# Patient Record
Sex: MALE | Race: WHITE
[De-identification: names, ages, dates, MRNs, and addresses within clinical notes are randomized per-mention and may not be internally consistent; named-entity substitution may affect disease eponyms.]

---

## 2017-06-17 ENCOUNTER — HEALTH MAINTENANCE LETTER (OUTPATIENT)
Age: 22
End: 2017-06-17

## 2017-10-05 ENCOUNTER — OFFICE VISIT (OUTPATIENT)
Dept: FAMILY MEDICINE | Facility: CLINIC | Age: 22
End: 2017-10-05
Payer: COMMERCIAL

## 2017-10-05 VITALS
SYSTOLIC BLOOD PRESSURE: 124 MMHG | HEART RATE: 101 BPM | DIASTOLIC BLOOD PRESSURE: 60 MMHG | WEIGHT: 160 LBS | OXYGEN SATURATION: 97 % | HEIGHT: 70 IN | TEMPERATURE: 98.2 F | BODY MASS INDEX: 22.9 KG/M2

## 2017-10-05 DIAGNOSIS — F41.1 GAD (GENERALIZED ANXIETY DISORDER): Primary | ICD-10-CM

## 2017-10-05 DIAGNOSIS — Z23 NEED FOR PROPHYLACTIC VACCINATION WITH TETANUS-DIPHTHERIA (TD): ICD-10-CM

## 2017-10-05 DIAGNOSIS — F11.21 HEROIN USE DISORDER, MODERATE, IN SUSTAINED REMISSION (H): ICD-10-CM

## 2017-10-05 DIAGNOSIS — Z23 NEED FOR PROPHYLACTIC VACCINATION AND INOCULATION AGAINST INFLUENZA: ICD-10-CM

## 2017-10-05 DIAGNOSIS — F32.A DEPRESSION, UNSPECIFIED DEPRESSION TYPE: ICD-10-CM

## 2017-10-05 DIAGNOSIS — F41.0 ANXIETY ATTACK: ICD-10-CM

## 2017-10-05 PROCEDURE — 99214 OFFICE O/P EST MOD 30 MIN: CPT | Performed by: FAMILY MEDICINE

## 2017-10-05 RX ORDER — HYDROXYZINE HYDROCHLORIDE 25 MG/1
25-50 TABLET, FILM COATED ORAL EVERY 6 HOURS PRN
Qty: 40 TABLET | Refills: 1 | Status: SHIPPED | OUTPATIENT
Start: 2017-10-05 | End: 2017-11-01

## 2017-10-05 RX ORDER — VENLAFAXINE HYDROCHLORIDE 37.5 MG/1
CAPSULE, EXTENDED RELEASE ORAL
Qty: 46 CAPSULE | Refills: 0 | Status: SHIPPED | OUTPATIENT
Start: 2017-10-05 | End: 2017-10-12

## 2017-10-05 ASSESSMENT — ANXIETY QUESTIONNAIRES
3. WORRYING TOO MUCH ABOUT DIFFERENT THINGS: NEARLY EVERY DAY
6. BECOMING EASILY ANNOYED OR IRRITABLE: NEARLY EVERY DAY
5. BEING SO RESTLESS THAT IT IS HARD TO SIT STILL: MORE THAN HALF THE DAYS
1. FEELING NERVOUS, ANXIOUS, OR ON EDGE: NEARLY EVERY DAY
7. FEELING AFRAID AS IF SOMETHING AWFUL MIGHT HAPPEN: NEARLY EVERY DAY
GAD7 TOTAL SCORE: 20
IF YOU CHECKED OFF ANY PROBLEMS ON THIS QUESTIONNAIRE, HOW DIFFICULT HAVE THESE PROBLEMS MADE IT FOR YOU TO DO YOUR WORK, TAKE CARE OF THINGS AT HOME, OR GET ALONG WITH OTHER PEOPLE: EXTREMELY DIFFICULT
2. NOT BEING ABLE TO STOP OR CONTROL WORRYING: NEARLY EVERY DAY

## 2017-10-05 ASSESSMENT — PATIENT HEALTH QUESTIONNAIRE - PHQ9
SUM OF ALL RESPONSES TO PHQ QUESTIONS 1-9: 13
5. POOR APPETITE OR OVEREATING: NEARLY EVERY DAY

## 2017-10-05 NOTE — NURSING NOTE
"Chief Complaint   Patient presents with     Anxiety       Initial /60 (BP Location: Right arm, Patient Position: Sitting, Cuff Size: Adult Large)  Pulse 101  Temp 98.2  F (36.8  C) (Oral)  Ht 5' 10\" (1.778 m)  Wt 160 lb (72.6 kg)  SpO2 97%  BMI 22.96 kg/m2 Estimated body mass index is 22.96 kg/(m^2) as calculated from the following:    Height as of this encounter: 5' 10\" (1.778 m).    Weight as of this encounter: 160 lb (72.6 kg).  Medication Reconciliation: complete  "

## 2017-10-05 NOTE — PATIENT INSTRUCTIONS
"               Panic Disorder  What is panic disorder?   Panic disorder is an anxiety disorder. When panic attacks occur repeatedly, without warning, it is called panic disorder. These attacks can happen many times every day or every week. People with this disorder might worry about having these attacks throughout the day. It can interfere with work and personal life.  How does it occur?   Panic is a \"fight or flight\" reaction. It is an adrenaline surge that goes wrong. How it happens is not known. Scientists know that certain parts of the brain and nervous system cause the emotional and physical surge of fear. A panic attack is very scary, but having one attack doesn't usually mean that you are developing panic disorder.  Panic disorder usually begins when you are a teenager or a young adult. Sometimes it begins after age 30, but almost never in middle age or later. It tends to run in families. Studies of identical twins suggest a genetic link to the disorder. However, one half or more of people with panic disorder do not have a close relative with the same problem.  Many people with panic disorder also have agoraphobia, which means you avoid going places or doing things because you are afraid you will panic and have no help. It is common to have depression along with panic disorder.  What are the symptoms?   Having at least 4 of the following symptoms means a person is having a panic attack:  feeling intense fear and being afraid that something terrible is about to happen   worrying about losing control   worrying about dying, going crazy, or having a heart attack   having many body symptoms such as a pounding heart, upset stomach, diarrhea, shaking, sweating, or being hot or cold   feeling like they are choking or can't breathe   being dizzy, faint, or lightheaded   feeling numb or tingling sensations in the arms, legs, or other parts of the body   feeling detached or as if they are watching themselves from " outside the body  These feelings start suddenly and become very strong, usually within 10 minutes. The attacks are often unpredictable.  The symptoms of panic disorder are the same as a panic attack except that the attacks come repeatedly, the person is quite fearful between attacks that another attack will happen, and changes behavior to avoid another panic attack. Panic attacks usually last from 20 to 30 minutes.  Because common symptoms of a panic attack include chest pain and shortness of breath, you may mistake a panic attack for a heart attack. If you have severe chest pain or trouble breathing, get medical treatment right away to find out the cause.  How is it diagnosed?   Your healthcare provider or a mental health professional can tell you if your symptoms are caused by panic disorder.  Your healthcare provider will ask about your symptoms and any drug or alcohol use. You may have lab tests to rule out medical problems such as hormone imbalances. It is important to make sure that medical problems are not causing the panic attacks. Some medicines may cause or increase panic attacks. You may need to change your medicines to make sure they are not part of the problem. No lab tests can diagnose panic disorder.  If you worry about having another panic attack, or have the symptoms of panic attacks for more than 30 days, it usually means that you have panic disorder.  How is it treated?   Do not try to overcome panic disorder all by yourself. Panic disorder can be successfully treated with psychotherapy and medicine. Discuss these treatment choices with your healthcare provider or a mental health professional.  Medicine  Several medicines can help treat panic disorder. Your healthcare provider will work with you to carefully select the best one for you.  Psychotherapy  Seeing a psychiatrist or psychotherapist is helpful. There are several kinds of therapy that can help a person with anxiety. Support groups are  also very helpful.  Cognitive behavioral therapy (CBT) is a form of psychotherapy that is especially effective with panic disorder. CBT is a way to help you identify and change thoughts that lead to panic attacks. Replacing negative thoughts with more positive ones can help you to control panic attacks and the fear that a panic attack will happen.  Claims have been made that certain herbal and dietary products help control panic disorder symptoms. No herb or dietary supplement has been proven to consistently or completely relieve symptoms of panic disorder. Supplements are not tested or standardized and may vary in strength and effects. They may have side effects and are not always safe.   Learning ways to relax may help. Yoga and meditation may also be helpful. You may want to talk with your healthcare provider about using these methods along with medicines and psychotherapy.   How long will the effects last?  Panic disorder may affect you for a short time or may continue for many years. With treatment, most people improve in less than a year.  How can I take care of myself?   Maintaining a healthy lifestyle is important. To help control panic disorder:  Get support. Talk with family and friends. Consider joining a support group in your area.   Learn to manage stress. Ask for help at home and work when the load is too great to handle. Find ways to relax, for example take up a hobby, listen to music, watch movies, take walks. Try deep breathing exercises when you feel stressed.   Take care of your physical health. Try to get at least 7 to 9 hours of sleep each night. Eat a healthy diet. Limit caffeine. If you smoke, quit. Avoid alcohol and drugs, because they can make your symptoms worse. Exercise according to your healthcare provider's instructions.   Check your medicines. To help prevent problems, tell your healthcare provider and pharmacist about all the medicines, natural remedies, vitamins, and other  supplements that you take.   Contact your healthcare provider or therapist if you have any questions or your symptoms seem to be getting worse.  When should I seek help?  Do not try to overcome panic disorder all by yourself. Seek help from your healthcare provider or a mental health professional.  Get emergency care if you or a loved one has serious thoughts of suicide or self harm. Also seek immediate help if you have severe chest pain or trouble breathing.    Published by [employer].  This content is reviewed periodically and is subject to change as new health information becomes available. The information is intended to inform and educate and is not a replacement for medical evaluation, advice, diagnosis or treatment by a healthcare professional.  Written by Isrrael Seymour MD, and Radha Barker, PhD, for [employer].    2011 [employer] and/or its affiliates. All rights reserved.

## 2017-10-05 NOTE — MR AVS SNAPSHOT
"              After Visit Summary   10/5/2017    Rik Smith    MRN: [id number]           Patient Information     Date Of Birth          1995        Visit Information        Provider Department      10/5/2017 2:40 PM Aaron Christiansen MD [facility]        Today's Diagnoses     SONIDO (generalized anxiety disorder)    -  1    Need for prophylactic vaccination and inoculation against influenza        Need for prophylactic vaccination with tetanus-diphtheria (TD)        Anxiety attack        Heroin use disorder, moderate, in sustained remission (H)        Depression, unspecified depression type          Care Instructions                   Panic Disorder  What is panic disorder?   Panic disorder is an anxiety disorder. When panic attacks occur repeatedly, without warning, it is called panic disorder. These attacks can happen many times every day or every week. People with this disorder might worry about having these attacks throughout the day. It can interfere with work and personal life.  How does it occur?   Panic is a \"fight or flight\" reaction. It is an adrenaline surge that goes wrong. How it happens is not known. Scientists know that certain parts of the brain and nervous system cause the emotional and physical surge of fear. A panic attack is very scary, but having one attack doesn't usually mean that you are developing panic disorder.  Panic disorder usually begins when you are a teenager or a young adult. Sometimes it begins after age 30, but almost never in middle age or later. It tends to run in families. Studies of identical twins suggest a genetic link to the disorder. However, one half or more of people with panic disorder do not have a close relative with the same problem.  Many people with panic disorder also have agoraphobia, which means you avoid going places or doing things because you are afraid you will panic and have no help. It is common to have depression along with panic " disorder.  What are the symptoms?   Having at least 4 of the following symptoms means a person is having a panic attack:  feeling intense fear and being afraid that something terrible is about to happen   worrying about losing control   worrying about dying, going crazy, or having a heart attack   having many body symptoms such as a pounding heart, upset stomach, diarrhea, shaking, sweating, or being hot or cold   feeling like they are choking or can't breathe   being dizzy, faint, or lightheaded   feeling numb or tingling sensations in the arms, legs, or other parts of the body   feeling detached or as if they are watching themselves from outside the body  These feelings start suddenly and become very strong, usually within 10 minutes. The attacks are often unpredictable.  The symptoms of panic disorder are the same as a panic attack except that the attacks come repeatedly, the person is quite fearful between attacks that another attack will happen, and changes behavior to avoid another panic attack. Panic attacks usually last from 20 to 30 minutes.  Because common symptoms of a panic attack include chest pain and shortness of breath, you may mistake a panic attack for a heart attack. If you have severe chest pain or trouble breathing, get medical treatment right away to find out the cause.  How is it diagnosed?   Your healthcare provider or a mental health professional can tell you if your symptoms are caused by panic disorder.  Your healthcare provider will ask about your symptoms and any drug or alcohol use. You may have lab tests to rule out medical problems such as hormone imbalances. It is important to make sure that medical problems are not causing the panic attacks. Some medicines may cause or increase panic attacks. You may need to change your medicines to make sure they are not part of the problem. No lab tests can diagnose panic disorder.  If you worry about having another panic attack, or have the  symptoms of panic attacks for more than 30 days, it usually means that you have panic disorder.  How is it treated?   Do not try to overcome panic disorder all by yourself. Panic disorder can be successfully treated with psychotherapy and medicine. Discuss these treatment choices with your healthcare provider or a mental health professional.  Medicine  Several medicines can help treat panic disorder. Your healthcare provider will work with you to carefully select the best one for you.  Psychotherapy  Seeing a psychiatrist or psychotherapist is helpful. There are several kinds of therapy that can help a person with anxiety. Support groups are also very helpful.  Cognitive behavioral therapy (CBT) is a form of psychotherapy that is especially effective with panic disorder. CBT is a way to help you identify and change thoughts that lead to panic attacks. Replacing negative thoughts with more positive ones can help you to control panic attacks and the fear that a panic attack will happen.  Claims have been made that certain herbal and dietary products help control panic disorder symptoms. No herb or dietary supplement has been proven to consistently or completely relieve symptoms of panic disorder. Supplements are not tested or standardized and may vary in strength and effects. They may have side effects and are not always safe.   Learning ways to relax may help. Yoga and meditation may also be helpful. You may want to talk with your healthcare provider about using these methods along with medicines and psychotherapy.   How long will the effects last?  Panic disorder may affect you for a short time or may continue for many years. With treatment, most people improve in less than a year.  How can I take care of myself?   Maintaining a healthy lifestyle is important. To help control panic disorder:  Get support. Talk with family and friends. Consider joining a support group in your area.   Learn to manage stress. Ask for  help at home and work when the load is too great to handle. Find ways to relax, for example take up a hobby, listen to music, watch movies, take walks. Try deep breathing exercises when you feel stressed.   Take care of your physical health. Try to get at least 7 to 9 hours of sleep each night. Eat a healthy diet. Limit caffeine. If you smoke, quit. Avoid alcohol and drugs, because they can make your symptoms worse. Exercise according to your healthcare provider's instructions.   Check your medicines. To help prevent problems, tell your healthcare provider and pharmacist about all the medicines, natural remedies, vitamins, and other supplements that you take.   Contact your healthcare provider or therapist if you have any questions or your symptoms seem to be getting worse.  When should I seek help?  Do not try to overcome panic disorder all by yourself. Seek help from your healthcare provider or a mental health professional.  Get emergency care if you or a loved one has serious thoughts of suicide or self harm. Also seek immediate help if you have severe chest pain or trouble breathing.    Published by [email or web address].  This content is reviewed periodically and is subject to change as new health information becomes available. The information is intended to inform and educate and is not a replacement for medical evaluation, advice, diagnosis or treatment by a healthcare professional.  Written by Isrrael Seymour MD, and Rdaha Barker, PhD, for [email or web address].    2011 [facility] and/or its affiliates. All rights reserved.             Follow-ups after your visit        Who to contact     If you have questions or need follow up information about today's clinic visit or your schedule please contact [facility] directly at [phone].  Normal or non-critical lab and imaging results will be communicated to you by MyChart, letter or phone within 4 business days after the clinic has received the results. If you  "do not hear from us within 7 days, please contact the clinic through [employer] or phone. If you have a critical or abnormal lab result, we will notify you by phone as soon as possible.  Submit refill requests through [employer] or call your pharmacy and they will forward the refill request to us. Please allow 3 business days for your refill to be completed.          Additional Information About Your Visit        Business [employer] Information     [employer] gives you secure access to your electronic health record. If you see a primary care provider, you can also send messages to your care team and make appointments. If you have questions, please call your primary care clinic.  If you do not have a primary care provider, please call [phone] and they will assist you.        Care EveryWhere ID     This is your Care EveryWhere ID. This could be used by other organizations to access your [facility] medical records  [id number]        Your Vitals Were     Pulse Temperature Height Pulse Oximetry BMI (Body Mass Index)       101 98.2  F (36.8  C) (Oral) 5' 10\" (1.778 m) 97% 22.96 kg/m2        Blood Pressure from Last 3 Encounters:   10/05/17 124/60   02/12/15 100/58   02/24/14 92/60    Weight from Last 3 Encounters:   10/05/17 160 lb (72.6 kg)   02/12/15 144 lb (65.3 kg) (42 %)*   04/30/14 137 lb (62.1 kg) (36 %)*     * Growth percentiles are based on [facility] 2-20 Years data.              We Performed the Following          ADMIN VACCINE, ADDL [44571]          ADMIN VACCINE, FIRST [08840]     HC FLU VAC PRESRV FREE QUAD SPLIT VIR 3+YRS IM     TDAP VACCINE (ADACEL)          Today's Medication Changes          These changes are accurate as of: 10/5/17  3:36 PM.  If you have any questions, ask your nurse or doctor.               Start taking these medicines.        Dose/Directions    hydrOXYzine 25 MG tablet   Commonly known as:  ATARAX   Used for:  Anxiety attack   Started by:  Aaron Christiansen MD        Dose:  25-50 mg   Take 1-2 tablets " (25-50 mg) by mouth every 6 hours as needed for anxiety   Quantity:  40 tablet   Refills:  1       venlafaxine 37.5 MG 24 hr capsule   Commonly known as:  EFFEXOR-XR   Used for:  SONIDO (generalized anxiety disorder), Anxiety attack, Depression, unspecified depression type   Started by:  Aaron Christiansen MD        Take 1 capsule daily for 14 days, then take 2 capsules daily.   Quantity:  46 capsule   Refills:  0            Where to get your medicines      These medications were sent to [facility] [place] [address]     Phone:  [phone]     hydrOXYzine 25 MG tablet    venlafaxine 37.5 MG 24 hr capsule                Primary Care Provider Office Phone # Fax #    Aaron Christiansen [phone]       [address] [place] [address]        Equal Access to Services     [facility]: Hadii tad ku hadasho Soomaali, waaxda luqadaha, qaybta kaalmada adeegyada, waxay yfnin haynancyn lexi carranza . So [facility] [phone].    ATENCIÓN: Si habla español, tiene a fallon disposición servicios gratuitos de asistencia lingüística. Llame al [phone].    We comply with applicable federal civil rights laws and [place] laws. We do not discriminate on the basis of race, color, national origin, age, disability, sex, sexual orientation, or gender identity.            Thank you!     Thank you for choosing [facility]  for your care. Our goal is always to provide you with excellent care. Hearing back from our patients is one way we can continue to improve our services. Please take a few minutes to complete the written survey that you may receive in the mail after your visit with us. Thank you!             Your Updated Medication List - Protect others around you: Learn how to safely use, store and throw away your medicines at www.disposemymeds.org.          This list is accurate as of: 10/5/17  3:36 PM.   Always use your most recent med list.                   Brand Name Dispense Instructions for use Diagnosis    hydrOXYzine 25 MG tablet    ATARAX    40 tablet    Take 1-2 tablets (25-50 mg) by mouth every 6 hours as needed for anxiety    Anxiety attack       venlafaxine 37.5 MG 24 hr capsule    EFFEXOR-XR    46 capsule    Take 1 capsule daily for 14 days, then take 2 capsules daily.    SONIDO (generalized anxiety disorder), Anxiety attack, Depression, unspecified depression type

## 2017-10-05 NOTE — PROGRESS NOTES
"  SUBJECTIVE:                                                    Rik Smith is a 21 year old male who presents to clinic today for the following health issues:    Abnormal Mood Symptoms/ Anxiety  Onset: since childhood-    Description:   Depression: no  Anxiety: YES    Accompanying Signs & Symptoms:  Still participating in activities that you used to enjoy: YES    Fatigue: YES  Irritability: YES  Difficulty concentrating: YES  Changes in appetite: YES- a lot less recently  Problems with sleep: YES  Heart racing/beating fast : YES  Thoughts of hurting yourself or others: none    History:   Recent stress: no  Prior depression hospitalization: None  Family history of depression: no  Family history of anxiety: no    Precipitating factors:   Alcohol/drug use: no    Alleviating factors:  nothing    Therapies Tried and outcome: None    Pt reports being anxious, \"on edge\" for some time now. He denies any stressful situations, he states he just can't relax. He reports self-medication, including marijuana, heroine and alcohol, in the past but has not done so for some time now. He went to treatment for substance abuse and has experienced success, though after a few trials. He has noticed a quick jump to anger when \"badgered\", something he is not comfortable with or used to. Raheel also experiences panic attack type episodes, that he refers to as \"high anxiety\".    Raheel experienced anxiety as a child as well. He reports his parents were alcoholics, and his father was relatively absent. Talking about this subject causes the pt difficulty and can induce anxiety.     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 He also reports regaining friendships he once lost to drug abuse.     Problem list and histories reviewed & adjusted, as indicated.  Additional history: as documented    ROS:  Constitutional, HEENT, cardiovascular, pulmonary, GI, , musculoskeletal, neuro, skin, endocrine and psych systems are negative, except as " "otherwise noted.    This document serves as a record of the services and decisions personally performed and made by Aaron Christiansen MD. It was created on her behalf by Rachel Engel, a trained medical scribe. The creation of this document is based the provider's statements to the medical scribe.  Scribe Rachel Engel 3:15 PM, October 5, 2017    OBJECTIVE:                                                    /60 (BP Location: Right arm, Patient Position: Sitting, Cuff Size: Adult Large)  Pulse 101  Temp 98.2  F (36.8  C) (Oral)  Ht 1.778 m (5' 10\")  Wt 72.6 kg (160 lb)  SpO2 97%  BMI 22.96 kg/m2 Body mass index is 22.96 kg/(m^2).   GENERAL: healthy, alert, well nourished, well hydrated, no distress  RESP: lungs clear to auscultation - no rales, no rhonchi, no wheezes  CV: regular rates and rhythm, normal S1 S2, no S3 or S4 and no murmur, no click or rub -  ABDOMEN: soft, no tenderness, no  hepatosplenomegaly, no masses, normal bowel sounds  MS: extremities- no gross deformities noted, no edema  SKIN: no suspicious lesions, no rashes  PSYCH: Anxiety; Alert and oriented times 3; speech- coherent , normal rate and volume; able to articulate logical thoughts, able to abstract reason, no tangential thoughts, no hallucinations or delusions, affect- normal   Diagnostic test results:  none      ASSESSMENT/PLAN:         Rik was seen today for anxiety.    Diagnoses and all orders for this visit:    SONIDO (generalized anxiety disorder) - Pt prescribed medications to help alleviate symptoms; encouraged to continue seeing psychologist  -     venlafaxine (EFFEXOR-XR) 37.5 MG 24 hr capsule; Take 1 capsule daily for 14 days, then take 2 capsules daily.    Anxiety attack - see above  -     venlafaxine (EFFEXOR-XR) 37.5 MG 24 hr capsule; Take 1 capsule daily for 14 days, then take 2 capsules daily.  -     hydrOXYzine (ATARAX) 25 MG tablet; Take 1-2 tablets (25-50 mg) by mouth every 6 hours as needed for anxiety    Heroin use " "disorder, moderate, in sustained remission (H) - Pt is in remission, doing very well    Depression, unspecified depression type - see above  -     venlafaxine (EFFEXOR-XR) 37.5 MG 24 hr capsule; Take 1 capsule daily for 14 days, then take 2 capsules daily.    Pt denied influenza vaccination today.    Risks, benefits and alternatives of treatments discussed. Plan agreed on.      Followup: Return in 3-4 weeks for med check    Will call, return to clinic, or go to ED if worsening or symptoms not improving as discussed.    See patient instructions.     BP Screening:   Last 3 BP Readings:    BP Readings from Last 3 Encounters:   10/05/17 124/60   02/12/15 100/58   02/24/14 92/60       The following was recommended to the patient:  Re-screen BP within a year and recommended lifestyle modifications  BMI:   Estimated body mass index is 22.96 kg/(m^2) as calculated from the following:    Height as of this encounter: 1.778 m (5' 10\").    Weight as of this encounter: 72.6 kg (160 lb).     Health Maintenance Topics with due status: Overdue       Topic Date Due    PEDS DTAP/TDAP 12/15/2007    HPV IMMUNIZATION 12/15/2007    TETANUS IMMUNIZATION (SYSTEM ASSIGNED) 12/15/2014    INFLUENZA VACCINE (SYSTEM ASSIGNED) 09/01/2017       Health maintenance reviewed/updated? Yes  The information in this document, created by the medical scribe for me, accurately reflects the services I personally performed and the decisions made by me. I have reviewed and approved this document for accuracy prior to leaving the patient care area.  3:14 PM, 10/05/17        Slim Christiansen MD   "

## 2017-10-06 ASSESSMENT — ANXIETY QUESTIONNAIRES: GAD7 TOTAL SCORE: 20

## 2017-10-31 ENCOUNTER — TELEPHONE (OUTPATIENT)
Dept: FAMILY MEDICINE | Facility: CLINIC | Age: 22
End: 2017-10-31

## 2017-10-31 ASSESSMENT — ANXIETY QUESTIONNAIRES
6. BECOMING EASILY ANNOYED OR IRRITABLE: MORE THAN HALF THE DAYS
IF YOU CHECKED OFF ANY PROBLEMS ON THIS QUESTIONNAIRE, HOW DIFFICULT HAVE THESE PROBLEMS MADE IT FOR YOU TO DO YOUR WORK, TAKE CARE OF THINGS AT HOME, OR GET ALONG WITH OTHER PEOPLE: EXTREMELY DIFFICULT
GAD7 TOTAL SCORE: 15
5. BEING SO RESTLESS THAT IT IS HARD TO SIT STILL: SEVERAL DAYS
3. WORRYING TOO MUCH ABOUT DIFFERENT THINGS: NEARLY EVERY DAY
2. NOT BEING ABLE TO STOP OR CONTROL WORRYING: NEARLY EVERY DAY
7. FEELING AFRAID AS IF SOMETHING AWFUL MIGHT HAPPEN: NOT AT ALL
1. FEELING NERVOUS, ANXIOUS, OR ON EDGE: NEARLY EVERY DAY

## 2017-10-31 ASSESSMENT — PATIENT HEALTH QUESTIONNAIRE - PHQ9
SUM OF ALL RESPONSES TO PHQ QUESTIONS 1-9: 3
5. POOR APPETITE OR OVEREATING: NEARLY EVERY DAY

## 2017-10-31 NOTE — TELEPHONE ENCOUNTER
Reason for Call:  Same Day Appointment, Requested Provider:  Aaron Christiansen MD    PCP: Aaron Christiansen    Reason for visit: The patient wants to be seen today by Dr. Christiansen for his anxiety. He says the SSRIs are not helping him at all.     Duration of symptoms: Years    Have you been treated for this in the past? Yes    Can we leave a detailed message on this number? YES    Phone number patient can be reached at: Cell number on file:    Telephone Information:   Mobile [phone]     Best Time: Anytime    Call taken on 10/31/2017 at 10:13 AM by Juju Nieves

## 2017-10-31 NOTE — PROGRESS NOTES
"  SUBJECTIVE:                                                    Rik Smith is a 21 year old male who presents to clinic today for the following health issues:    Concern - Anxiety  Onset: very long time, years, ever since patient can remember. Has gotten much worse in the past 2 weeks    Description:   States his anxiety has been pretty bad lately, especially in the morning.    Intensity: moderate, severe    Progression of Symptoms:  worsening and constant    Accompanying Signs & Symptoms:  In the morning, patient states that he wakes up with stomach pain and chest pressure R/T anxiety. Cannot stop thinking about everything.   Has been having panic attacks when he wakes up every morning  Vomiting    Previous history of similar problem:   Yes    Precipitating factors:   Worsened by: None    Alleviating factors:  Improved by: None    Problem list and histories reviewed & adjusted, as indicated.  Additional history: as documented    ROS:  Constitutional, HEENT, cardiovascular, pulmonary, GI, , musculoskeletal, neuro, skin, endocrine and psych systems are negative, except as otherwise noted.    OBJECTIVE:                                                    There were no vitals taken for this visit. There is no height or weight on file to calculate BMI.   {.:515216::\"GENERAL: healthy, alert, well nourished, well hydrated, no distress\",\"HENT: ear canals- normal; TMs- normal; Nose- normal; Mouth- no ulcers, no lesions\",\"NECK: no tenderness, no adenopathy, no asymmetry, no masses, no stiffness; thyroid- normal to palpation\",\"RESP: lungs clear to auscultation - no rales, no rhonchi, no wheezes\",\"CV: regular rates and rhythm, normal S1 S2, no S3 or S4 and no murmur, no click or rub -\",\"ABDOMEN: soft, no tenderness, no  hepatosplenomegaly, no masses, normal bowel sounds\"}  Diagnostic test results:  {DIAGNOSTIC TEST RESULTS:335952::\"none \"}     ASSESSMENT/PLAN:         There are no diagnoses linked to this " encounter.    Risks, benefits and alternatives of treatments discussed. Plan agreed on.      Followup:***    Will call, return to clinic, or go to ED if worsening or symptoms not improving as discussed.    See patient instructions.     {Quality Requirements:550091}        Slim Christiansen MD   Pager: [phone]

## 2017-10-31 NOTE — TELEPHONE ENCOUNTER
Called # below    Concern - Anxiety  Onset: very long time, years, ever since patient can remember. Has gotten much worse in the past 2 weeks    Description:   States his anxiety has been pretty bad lately, especially in the morning.    Intensity: moderate, severe    Progression of Symptoms:  worsening and constant    Accompanying Signs & Symptoms:  In the morning, patient states that he wakes up with stomach pain and chest pressure R/T anxiety. Cannot stop thinking about everything.   Has been having panic attacks when he wakes up every morning  Vomiting    Previous history of similar problem:   Yes    Precipitating factors:   Worsened by: None    Alleviating factors:  Improved by: None    Therapies Tried and outcome:        Disp Refills Start End LOBITO      venlafaxine (EFFEXOR-XR) 37.5 MG 24 hr capsule 46 capsule 0 10/5/2017  --     Sig: Take 1 capsule daily for 14 days, then take 2 capsules daily.        Disp Refills Start End LOBITO      hydrOXYzine (ATARAX) 25 MG tablet 40 tablet 1 10/5/2017  --     Sig: Take 1-2 tablets (25-50 mg) by mouth every 6 hours as needed for anxiety     Patient stated that he stopped taking the medication after 1 week because he did not notice any difference.     PHQ9/GAD7 done via phone:  PHQ-9 SCORE 2/3/2011 10/5/2017 10/31/2017   Total Score 17 - -   Total Score - 13 3     SONIDO-7 SCORE 10/5/2017 10/31/2017   Total Score 20 15     DENIES: suicidal thoughts/ideations, thoughts of hurting himself or others.     OV scheduled for 4pm with MD RAI (ok'd per agapito).     Jenelle Larson RN  [place] [facility]

## 2017-11-01 ENCOUNTER — TRANSFERRED RECORDS (OUTPATIENT)
Dept: HEALTH INFORMATION MANAGEMENT | Facility: CLINIC | Age: 22
End: 2017-11-01

## 2017-11-01 ENCOUNTER — OFFICE VISIT (OUTPATIENT)
Dept: FAMILY MEDICINE | Facility: CLINIC | Age: 22
End: 2017-11-01
Payer: COMMERCIAL

## 2017-11-01 VITALS
BODY MASS INDEX: 22.12 KG/M2 | TEMPERATURE: 97.5 F | DIASTOLIC BLOOD PRESSURE: 82 MMHG | HEART RATE: 101 BPM | WEIGHT: 154.5 LBS | OXYGEN SATURATION: 97 % | SYSTOLIC BLOOD PRESSURE: 128 MMHG | HEIGHT: 70 IN

## 2017-11-01 DIAGNOSIS — F41.0 ANXIETY ATTACK: ICD-10-CM

## 2017-11-01 DIAGNOSIS — F11.20 METHADONE MAINTENANCE THERAPY PATIENT (H): Primary | ICD-10-CM

## 2017-11-01 DIAGNOSIS — F41.1 GAD (GENERALIZED ANXIETY DISORDER): ICD-10-CM

## 2017-11-01 PROCEDURE — 99214 OFFICE O/P EST MOD 30 MIN: CPT | Performed by: PHYSICIAN ASSISTANT

## 2017-11-01 RX ORDER — BUPROPION HYDROCHLORIDE 150 MG/1
150 TABLET ORAL EVERY MORNING
Qty: 90 TABLET | Refills: 0 | Status: SHIPPED | OUTPATIENT
Start: 2017-11-01 | End: 2017-11-14

## 2017-11-01 RX ORDER — METHADONE HYDROCHLORIDE 10 MG/1
40 TABLET ORAL EVERY OTHER DAY
Refills: 0 | COMMUNITY
Start: 2017-11-01 | End: 2019-07-30

## 2017-11-01 RX ORDER — PROPRANOLOL HYDROCHLORIDE 60 MG/1
60 TABLET ORAL 2 TIMES DAILY PRN
Qty: 60 TABLET | Refills: 1 | Status: SHIPPED | OUTPATIENT
Start: 2017-11-01 | End: 2017-11-14

## 2017-11-01 ASSESSMENT — ANXIETY QUESTIONNAIRES: GAD7 TOTAL SCORE: 15

## 2017-11-01 NOTE — PROGRESS NOTES
"  SUBJECTIVE:   Rik Smith is a 21 year old male who presents to clinic today for the following health issues:    Anxiety  Rik presents to clinic for increased anxiety within the last month. He reports that the anxiety is severe in the morning right when he wakes up. When the anxiety increases her gets a \"dropping\" feeling in his stomach and chest with associated nausea. He reports there has not been any significant changes in his home life, family, or work. He reports that he started a new job in the last 2 weeks but reports that it is going very well. He was recently prescribed venlafaxine and hydroxyzine by Dr. Christiansen ([facility]) but discontinued the medications within a week due to him not feeling a difference. He reports he has been on SSRI's (zoloft in 2012) in the past for extended periods and felt no difference so he feels sceptical towards that class of drug. He has set up appointments for anxiety therapy but has had to reschedule due to work. Of note Rik has a history of heroin abuse for which he seen at [facility] in [place] in 2015. He reports that he has been sober since completing the 6 week program. He is currently seen at the Methadone clinic in [place] at takes 40mg approximately every other day. He voiced intents to stopping the methadone treatment as he feels it is similar to being addicted to a substance. He is also seeing a personal therapist weekly through the clinic and is administered random drug screens. Rik has been seen at the methadone clinic for a little more than one year. He does not attribute his increased anxiety to a methadone taper or decrease. He reports that he has a good support system and home and denies thoughts of self harm or harming others.    PHQ-9 SCORE 2/3/2011 10/5/2017 10/31/2017   Total Score 17 - -   Total Score - 13 3     SONIDO-7 SCORE 10/5/2017 10/31/2017   Total Score 20 15     Problem list and histories reviewed & " adjusted, as indicated.  Additional history: as documented    Patient Active Problem List   Diagnosis     Attention deficit hyperactivity disorder (ADHD)     Anxiety attack     SONIDO (generalized anxiety disorder)     Heroin use disorder, moderate, in sustained remission (H)     Past Surgical History:   Procedure Laterality Date     none         Social History   Substance Use Topics     Smoking status: Current Every Day Smoker     Smokeless tobacco: Never Used     Alcohol use No     Family History   Problem Relation Age of Onset     Asthma Father      Alcohol/Drug Father      Thyroid Disease Maternal Grandmother      HEART DISEASE Maternal Grandfather      DIABETES Paternal Grandmother      type 1     Lipids Paternal Grandfather      CEREBROVASCULAR DISEASE Paternal Grandfather 40     Hypertension Paternal Grandfather      Alcohol/Drug Paternal Grandfather      Substance Abuse Brother      Heroin addiction         Current Outpatient Prescriptions   Medication Sig Dispense Refill     methadone (DOLOPHINE) 10 MG tablet Take 4 tablets (40 mg) by mouth every other day  0     propranolol HCl 60 MG TABS Take 1 tablet (60 mg) by mouth 2 times daily as needed For anxiety 60 tablet 1     buPROPion (WELLBUTRIN XL) 150 MG 24 hr tablet Take 1 tablet (150 mg) by mouth every morning 90 tablet 0     Allergies   Allergen Reactions     Amoxicillin-Pot Clavulanate Hives         Reviewed and updated as needed this visit by clinical staff  Tobacco  Allergies  Meds  Med Hx  Surg Hx  Fam Hx  Soc Hx      Reviewed and updated as needed this visit by Provider  Tobacco  Allergies  Med Hx  Surg Hx  Fam Hx  Soc Hx        ROS:  Constitutional, HEENT, cardiovascular, pulmonary, GI, , musculoskeletal, neuro, skin, endocrine and psych systems are negative, except as otherwise noted.    This document serves as a record of the services and decisions personally performed and made by Dee Rubio PA-C. It was created on her behalf by  "Vimal Campo, a trained medical scribe. The creation of this document is based on the provider's statements to the medical scribe.  Vimal Alber 11:36 AM November 1, 2017    OBJECTIVE:   /82  Pulse 101  Temp 97.5  F (36.4  C) (Tympanic)  Ht 5' 10\" (1.778 m)  Wt 154 lb 8 oz (70.1 kg)  SpO2 97%  BMI 22.17 kg/m2  Body mass index is 22.17 kg/(m^2).  GENERAL: healthy, alert and no distress  RESP: lungs clear to auscultation - no rales, rhonchi or wheezes  CV: regular rate and rhythm, normal S1 S2, no S3 or S4, no murmur, click or rub, no peripheral edema and peripheral pulses strong  PSYCH: mentation appears normal, affect normal/bright    Diagnostic Test Results:  none     ASSESSMENT/PLAN:   Rik was seen today for anxiety.    Diagnoses and all orders for this visit:    Methadone maintenance therapy patient (H)  Patient is being seen at methadone clinic in [place]. He is taking 40mg of methadone approximately every other day or less frequently than that. Patient voiced intent to cease methadone treatment. He is also seeing a personal therapist weekly at the methadone clinic.    Anxiety attack, SONIDO (generalized anxiety disorder)  Patient reports increased anxiety in the last month and does not report significant life events that could be contributing to the anxiety. Patient is very hesitant to start SSRI medications as he has tried Zoloft in the past and it was unsuccessful in treating is anxiety. He is also a former heroin abuser and would not like to start on any potentially addictive medication. Discussed with patient to contact his methadone clinic to see if they would approve his use of gabapentin. Also discussed with patient the importance of attending anxiety therapy to alleviate some symptoms. Genesight testing administered upon visit today and will contact patient when results are received. Start bupropion and propanolol to treat current anxiety. Follow up in 6 weeks for a med " check or sooner if medication is not effective.  -     buPROPion (WELLBUTRIN XL) 150 MG 24 hr tablet; Take 1 tablet (150 mg) by mouth every morning  -     propranolol HCl 60 MG TABS; Take 1 tablet (60 mg) by mouth 2 times daily as needed For anxiety    Greater than 25 minutes were spent with the patient. The majority of this time was coordinating care and counseling regarding the above diagnosis .    The information in this document, created by the medical scribe for me, accurately reflects the services I personally performed and the decisions made by me. I have reviewed and approved this document for accuracy prior to leaving the patient care area.  November 1, 2017 11:41 AM    Dee Rubio PA-C  [facility]

## 2017-11-01 NOTE — MR AVS SNAPSHOT
"              After Visit Summary   11/1/2017    Rik Smith    MRN: [id number]           Patient Information     Date Of Birth          1995        Visit Information        Provider Department      11/1/2017 11:20 AM Dee Rubio PA-C [facility]        Today's Diagnoses     Methadone maintenance therapy patient (H)    -  1    Anxiety attack        SONIDO (generalized anxiety disorder)           Follow-ups after your visit        Who to contact     If you have questions or need follow up information about today's clinic visit or your schedule please contact [facility] directly at [phone].  Normal or non-critical lab and imaging results will be communicated to you by [employer], letter or phone within 4 business days after the clinic has received the results. If you do not hear from us within 7 days, please contact the clinic through [employer] or phone. If you have a critical or abnormal lab result, we will notify you by phone as soon as possible.  Submit refill requests through [employer] or call your pharmacy and they will forward the refill request to us. Please allow 3 business days for your refill to be completed.          Additional Information About Your Visit        MyChart Information     [employer] gives you secure access to your electronic health record. If you see a primary care provider, you can also send messages to your care team and make appointments. If you have questions, please call your primary care clinic.  If you do not have a primary care provider, please call [phone] and they will assist you.        Care EveryWhere ID     This is your Care EveryWhere ID. This could be used by other organizations to access your [facility] medical records  [id number]        Your Vitals Were     Pulse Temperature Height Pulse Oximetry BMI (Body Mass Index)       101 97.5  F (36.4  C) (Tympanic) 5' 10\" (1.778 m) 97% 22.17 kg/m2        Blood Pressure from Last 3 " Encounters:   11/01/17 128/82   10/05/17 124/60   02/12/15 100/58    Weight from Last 3 Encounters:   11/01/17 154 lb 8 oz (70.1 kg)   10/05/17 160 lb (72.6 kg)   02/12/15 144 lb (65.3 kg) (42 %)*     * Growth percentiles are based on [facility] 2-20 Years data.              Today, you had the following     No orders found for display         Today's Medication Changes          These changes are accurate as of: 11/1/17 11:55 AM.  If you have any questions, ask your nurse or doctor.               Start taking these medicines.        Dose/Directions    buPROPion 150 MG 24 hr tablet   Commonly known as:  WELLBUTRIN XL   Used for:  SONIDO (generalized anxiety disorder), Anxiety attack   Started by:  Dee Rubio PA-C        Dose:  150 mg   Take 1 tablet (150 mg) by mouth every morning   Quantity:  90 tablet   Refills:  0       propranolol HCl 60 MG Tabs   Used for:  SONIDO (generalized anxiety disorder)   Started by:  Dee Rubio PA-C        Dose:  60 mg   Take 1 tablet (60 mg) by mouth 2 times daily as needed For anxiety   Quantity:  60 tablet   Refills:  1         Stop taking these medicines if you haven't already. Please contact your care team if you have questions.     hydrOXYzine 25 MG tablet   Commonly known as:  ATARAX   Stopped by:  Dee Rubio PA-C                Where to get your medicines      These medications were sent to [facility] [address] [address]     Phone:  [phone]     buPROPion 150 MG 24 hr tablet    propranolol HCl 60 MG Tabs                Primary Care Provider Office Phone # Fax #    Aaron Christiansen [phone]       [address] [place] [address]        Equal Access to Services     NASH CASON : Guerita amado Soarleen, waaxda luqadaha, qaybta kaalmada negar, alecia andersen. So [facility] [phone].    ATENCIÓN: Si crista  español, tiene a fallon disposición servicios gratuitos de asistencia lingüística. Karen العراقي [phone].    We comply with applicable federal civil rights laws and [place] laws. We do not discriminate on the basis of race, color, national origin, age, disability, sex, sexual orientation, or gender identity.            Thank you!     Thank you for choosing [facility]  for your care. Our goal is always to provide you with excellent care. Hearing back from our patients is one way we can continue to improve our services. Please take a few minutes to complete the written survey that you may receive in the mail after your visit with us. Thank you!             Your Updated Medication List - Protect others around you: Learn how to safely use, store and throw away your medicines at www.disposemymeds.org.          This list is accurate as of: 11/1/17 11:55 AM.  Always use your most recent med list.                   Brand Name Dispense Instructions for use Diagnosis    buPROPion 150 MG 24 hr tablet    WELLBUTRIN XL    90 tablet    Take 1 tablet (150 mg) by mouth every morning    SONIDO (generalized anxiety disorder), Anxiety attack       methadone 10 MG tablet    DOLOPHINE     Take 4 tablets (40 mg) by mouth every other day    Methadone maintenance therapy patient (H)       propranolol HCl 60 MG Tabs     60 tablet    Take 1 tablet (60 mg) by mouth 2 times daily as needed For anxiety    SONIDO (generalized anxiety disorder)

## 2017-11-01 NOTE — NURSING NOTE
"Chief Complaint   Patient presents with     Anxiety       Initial /82  Pulse 101  Temp 97.5  F (36.4  C) (Tympanic)  Ht 5' 10\" (1.778 m)  Wt 154 lb 8 oz (70.1 kg)  SpO2 97%  BMI 22.17 kg/m2 Estimated body mass index is 22.17 kg/(m^2) as calculated from the following:    Height as of this encounter: 5' 10\" (1.778 m).    Weight as of this encounter: 154 lb 8 oz (70.1 kg).  Medication Reconciliation: complete   Patience Johnson, ESAU      "

## 2017-11-06 ENCOUNTER — TELEPHONE (OUTPATIENT)
Dept: FAMILY MEDICINE | Facility: CLINIC | Age: 22
End: 2017-11-06

## 2017-11-06 NOTE — TELEPHONE ENCOUNTER
"Please call patient.  His genesight results are in.  It appears that the Wellbutrin that he was started on is one of the recommended medications based on his genetics.  Most other antidepressants/anti-anxiety medications are in the \"do not use\" or \"use with caution\" columns.  Have him f/u with me in 6 weeks to see how he is doing.    I will mail him a copy of the report as well.      Dee Rubio MS, PA-C    "

## 2017-11-06 NOTE — TELEPHONE ENCOUNTER
Pt was not not there.   Attempt #1.    Maribell Montenegro contacted Rik on 11/06/17 and left a message. If patient calls back please contact RN team.  Prudence Montenegro RN  [facility]

## 2017-11-07 NOTE — TELEPHONE ENCOUNTER
"Called #   Telephone Information:   Mobile [phone]     The voicemail said \"this is omars phone\"     RN did not LM     Sanjuaan Carpenter RN, BSN  [facility]          "

## 2017-11-08 NOTE — TELEPHONE ENCOUNTER
Same as below noted. Forms were mailed to pt with results. Final attempt.    Prudence Montenegro RN  [facility]

## 2017-11-14 ENCOUNTER — OFFICE VISIT (OUTPATIENT)
Dept: FAMILY MEDICINE | Facility: CLINIC | Age: 22
End: 2017-11-14
Payer: COMMERCIAL

## 2017-11-14 VITALS
WEIGHT: 158 LBS | HEIGHT: 70 IN | TEMPERATURE: 98.4 F | HEART RATE: 90 BPM | OXYGEN SATURATION: 99 % | BODY MASS INDEX: 22.62 KG/M2

## 2017-11-14 DIAGNOSIS — F41.0 ANXIETY ATTACK: ICD-10-CM

## 2017-11-14 DIAGNOSIS — J02.9 SORE THROAT: Primary | ICD-10-CM

## 2017-11-14 DIAGNOSIS — F41.1 GAD (GENERALIZED ANXIETY DISORDER): ICD-10-CM

## 2017-11-14 DIAGNOSIS — F17.200 TOBACCO USE DISORDER: ICD-10-CM

## 2017-11-14 LAB
DEPRECATED S PYO AG THROAT QL EIA: NORMAL
SPECIMEN SOURCE: NORMAL

## 2017-11-14 PROCEDURE — 87081 CULTURE SCREEN ONLY: CPT | Performed by: FAMILY MEDICINE

## 2017-11-14 PROCEDURE — 87880 STREP A ASSAY W/OPTIC: CPT | Performed by: FAMILY MEDICINE

## 2017-11-14 PROCEDURE — 99213 OFFICE O/P EST LOW 20 MIN: CPT | Performed by: FAMILY MEDICINE

## 2017-11-14 NOTE — PROGRESS NOTES
"  SUBJECTIVE:                                                    Rik Smith is a 21 year old male who presents to clinic today for the following health issues:    Acute Illness   Acute illness concerns: Sore Throat  Onset: 2 days    Fever: no    Chills/Sweats: no    Headache (location?): no    Sinus Pressure:YES    Conjunctivitis:  no    Ear Pain: YES-         Rhinorrhea: YES    Congestion: YES    Sore Throat: YES     Cough: YES-productive of yellow sputum, productive of green sputum    Wheeze: YES    Decreased Appetite: no    Nausea: YES    Vomiting: YES    Diarrhea:  no    Dysuria/Freq.: no    Fatigue/Achiness: YES    Sick/Strep Exposure: YES     Therapies Tried and outcome: OTC    - Patient reports a sore throat, accompanied with mucous production. He states the irritation in his throat causes him to feel nauseous, to the point of almost vomiting. Rik does smoke, but states he has limited his tobacco intake since becoming sick. He also reports waking up 2-3 times a night, and is unable to nap during the day.     Problem list and histories reviewed & adjusted, as indicated.  Additional history: as documented    ROS:   Constitutional, HEENT, cardiovascular, pulmonary, GI, , musculoskeletal, neuro, skin, endocrine and psych systems are negative, except as otherwise noted.  This document serves as a record of the services and decisions personally performed and made by Aaron Christiansen MD. It was created on her behalf by Rachel Engel, a trained medical scribe. The creation of this document is based the provider's statements to the medical scribe.  Scribe Rachel Engel 12:00 PM, November 14, 2017    OBJECTIVE:                     Pulse 90  Temp 98.4  F (36.9  C) (Oral)  Ht 1.778 m (5' 10\")  Wt 71.7 kg (158 lb)  SpO2 99%  BMI 22.67 kg/m2  GENERAL: no apparent distress  EYES: Conjunctiva are not injected, no discharge.  EARS: Left TM -no erythema, no effusion,  not bulged.               Right TM -no erythema, " no effusion,  not bulged.  NOSE: no discharge, no sinus tenderness  THROAT: no erythema, no exudate, no lesions  NECK: supple, no adenopathy.  CARDIAC: regular rate and rhythm, no murmur  RESP: clear, no wheezing, no rales, no rhonchi  ABD: soft, no distension, no tenderness  SKIN: No rashes    Diagnostic test results:  Results for orders placed or performed in visit on 11/14/17 (from the past 24 hour(s))   Strep, Rapid Screen   Result Value Ref Range    Specimen Description Throat     Rapid Strep A Screen       NEGATIVE: No Group A streptococcal antigen detected by immunoassay, await culture report.     ASSESSMENT/PLAN:                       Rik was seen today for pharyngitis.    Diagnoses and all orders for this visit:    Sore throat - Rapid screen negative; Pt will be called with culture results within 24 hours; Advised to get rest and treat symptoms with throat lozenges, tea  -     Strep, Rapid Screen  -     Beta strep group A culture    Tobacco use disorder - Pt maintains tobacco use, has cut down since becoming sick    Anxiety attack/SONIDO (generalized anxiety disorder) - Pt is interested in seeking help from a psychiatrist; Referral given  -     MENTAL HEALTH REFERRAL        Symptomatic cares and fever control(if indicated) discussed.  Risks and benefits of meds discussed.    See patient instructions.    Health Maintenance Topics with due status: Overdue       Topic Date Due    PEDS DTAP/TDAP 12/15/2007    HPV IMMUNIZATION 12/15/2007    TETANUS IMMUNIZATION (SYSTEM ASSIGNED) 12/15/2014    INFLUENZA VACCINE (SYSTEM ASSIGNED) 09/01/2017       The information in this document, created by the medical scribe for me, accurately reflects the services I personally performed and the decisions made by me. I have reviewed and approved this document for accuracy prior to leaving the patient care area.  12:00 PM, 11/14/17        Slim Christiansen MD

## 2017-11-14 NOTE — NURSING NOTE
"Chief Complaint   Patient presents with     Pharyngitis       Initial Pulse 90  Temp 98.4  F (36.9  C) (Oral)  Ht 5' 10\" (1.778 m)  Wt 158 lb (71.7 kg)  SpO2 99%  BMI 22.67 kg/m2 Estimated body mass index is 22.67 kg/(m^2) as calculated from the following:    Height as of this encounter: 5' 10\" (1.778 m).    Weight as of this encounter: 158 lb (71.7 kg).  Medication Reconciliation: complete  "

## 2017-11-14 NOTE — LETTER
[address] [address]                                                                                                       [phone]    November 14, 2017    Rik Smith  [address] [place] [address]      To Whom it May Concern:    The above patient is unable to attend work for 11/14/17 due to a medical issue. Please contact me with questions or concerns.      Sincerely,                  Slim Christiansen M.D.

## 2017-11-14 NOTE — MR AVS SNAPSHOT
After Visit Summary   11/14/2017    Rik Smith    MRN: [id number]           Patient Information     Date Of Birth          1995        Visit Information        Provider Department      11/14/2017 11:20 AM Aaron Christiansen MD [facility]        Today's Diagnoses     Sore throat    -  1    Tobacco use disorder        Anxiety attack        SONIDO (generalized anxiety disorder)           Follow-ups after your visit        Additional Services     MENTAL HEALTH REFERRAL       Your provider has referred you to: [facility] [phone]   [email or web address]   *Referral from [facility] Primary Care Provider required - Consultation Model - medication management & future refills will be returned to [facility] PCP upon completion of evaluation  *Please call to schedule an appointment.    All scheduling is subject to the client's specific insurance plan & benefits, provider/location availability, and provider clinical specialities.  Please arrive 15 minutes early for your first appointment and bring your completed paperwork.    Please be aware that coverage of these services is subject to the terms and limitations of your health insurance plan.  Call member services at your health plan with any benefit or coverage questions.                  Who to contact     If you have questions or need follow up information about today's clinic visit or your schedule please contact [facility] directly at [phone].  Normal or non-critical lab and imaging results will be communicated to you by MyChart, letter or phone within 4 business days after the clinic has received the results. If you do not hear from us within 7 days, please contact the clinic through MyChart or phone. If you have a critical or abnormal lab result, we will notify you by phone as soon as possible.  Submit refill requests  "through [employer] or call your pharmacy and they will forward the refill request to us. Please allow 3 business days for your refill to be completed.          Additional Information About Your Visit        [employer] Information     [employer] gives you secure access to your electronic health record. If you see a primary care provider, you can also send messages to your care team and make appointments. If you have questions, please call your primary care clinic.  If you do not have a primary care provider, please call [phone] and they will assist you.        Care EveryWhere ID     This is your Care EveryWhere ID. This could be used by other organizations to access your [place] medical records  [id number]        Your Vitals Were     Pulse Temperature Height Pulse Oximetry BMI (Body Mass Index)       90 98.4  F (36.9  C) (Oral) 5' 10\" (1.778 m) 99% 22.67 kg/m2        Blood Pressure from Last 3 Encounters:   11/01/17 128/82   10/05/17 124/60   02/12/15 100/58    Weight from Last 3 Encounters:   11/14/17 158 lb (71.7 kg)   11/01/17 154 lb 8 oz (70.1 kg)   10/05/17 160 lb (72.6 kg)              We Performed the Following     Beta strep group A culture     MENTAL HEALTH REFERRAL     Strep, Rapid Screen        Primary Care Provider Office Phone # Fax #    Aaron Christiansen [phone] [address] [place] [address]        Equal Access to Services     [facility]: Hadii aad ku hadasho Soomaali, waaxda luqadaha, qaybta kaalmada adeegyada, alecia carranza . So [facility] [phone].    ATENCIÓN: Si habla español, tiene a fallon disposición servicios gratuitos de asistencia lingüística. Llame al [phone].    We comply with applicable federal civil rights laws and [place] laws. We do not discriminate on the basis of race, color, national origin, age, disability, sex, sexual orientation, or gender identity.            Thank you!     Thank you for choosing [facility] [place]  for your care. Our goal is always to provide you with excellent care. Hearing back from our patients is one way we can continue to improve our services. Please take a few minutes to complete the written survey that you may receive in the mail after your visit with us. Thank you!             Your Updated Medication List - Protect others around you: Learn how to safely use, store and throw away your medicines at www.disposemymeds.org.          This list is accurate as of: 11/14/17 12:04 PM.  Always use your most recent med list.                   Brand Name Dispense Instructions for use Diagnosis    methadone 10 MG tablet    DOLOPHINE     Take 4 tablets (40 mg) by mouth every other day    Methadone maintenance therapy patient (H)

## 2017-11-15 ENCOUNTER — OFFICE VISIT (OUTPATIENT)
Dept: FAMILY MEDICINE | Facility: CLINIC | Age: 22
End: 2017-11-15
Payer: COMMERCIAL

## 2017-11-15 VITALS
BODY MASS INDEX: 22.62 KG/M2 | HEART RATE: 86 BPM | TEMPERATURE: 98.5 F | DIASTOLIC BLOOD PRESSURE: 64 MMHG | OXYGEN SATURATION: 96 % | HEIGHT: 70 IN | SYSTOLIC BLOOD PRESSURE: 102 MMHG | WEIGHT: 158 LBS

## 2017-11-15 DIAGNOSIS — R07.0 THROAT PAIN: Primary | ICD-10-CM

## 2017-11-15 LAB
BACTERIA SPEC CULT: NORMAL
SPECIMEN SOURCE: NORMAL

## 2017-11-15 PROCEDURE — 99213 OFFICE O/P EST LOW 20 MIN: CPT | Performed by: PHYSICIAN ASSISTANT

## 2017-11-15 NOTE — PROGRESS NOTES
"  SUBJECTIVE:                                                    Rik Smith is a 21 year old male who presents to clinic today for the following health issues:    Seen yesterday for sore throat for 2 days -- strep and culture were neagative.  States sore throat worse. Nauseated this morning - nasal draining.     Needs letter for work as he did not feel well enough to go in.  He reports that he works at the [employer] in [place].      Patient reports that he woke up this morning and felt like his symptoms were worse.  He also has some nausea.  He denies diarrhea.    He reports that he does have mucus production as well.  He does have a cough.      Problem list and histories reviewed & adjusted, as indicated.  Additional history: as documented      ROS:  Constitutional, HEENT, cardiovascular, pulmonary, GI, , musculoskeletal, neuro, skin, endocrine and psych systems are negative, except as otherwise noted.    OBJECTIVE:                                                    /64 (BP Location: Left arm, Patient Position: Chair, Cuff Size: Adult Regular)  Pulse 86  Temp 98.5  F (36.9  C) (Oral)  Ht 5' 10\" (1.778 m)  Wt 158 lb (71.7 kg)  SpO2 96%  BMI 22.67 kg/m2  Body mass index is 22.67 kg/(m^2).  GENERAL: healthy, alert and no distress  EYES: Eyes grossly normal to inspection, PERRL and conjunctivae and sclerae normal  HENT: ear canals and TM's normal, nose and mouth without ulcers or lesions  NECK: no adenopathy, no asymmetry, masses, or scars and thyroid normal to palpation  RESP: lungs clear to auscultation - no rales, rhonchi or wheezes  CV: regular rate and rhythm, normal S1 S2, no S3 or S4, no murmur, click or rub, no peripheral edema and peripheral pulses strong  ABDOMEN: soft, nontender, no hepatosplenomegaly, no masses and bowel sounds normal  MS: no gross musculoskeletal defects noted, no edema  NEURO: Normal strength and tone, mentation intact and speech normal  PSYCH: mentation appears normal, " affect normal/bright    Diagnostic Test Results:  Declined by patient     ASSESSMENT/PLAN:                                                      Rik was seen today for pharyngitis.    Diagnoses and all orders for this visit:    Throat pain    - Normal appearing exam today.  No edema or erythema of the tonsils or pharynx noted.  No sign of abscess noted and no exudates seen on exam.  Non-obstructed airway.    - Patient advised to have mono test and a CBC done today for further work-up, but he declined.  Patient requests a note for work today.      - Patient advised to followup with any concerns.  He should be seen sooner if symptoms change or worsen in any way.     -- Patient agrees with and understands the plan today.     See Patient Instructions        Saray Stovall PA-C    [facility] [place]

## 2017-11-15 NOTE — MR AVS SNAPSHOT
"              After Visit Summary   11/15/2017    Rik Smith    MRN: [id number]           Patient Information     Date Of Birth          1995        Visit Information        Provider Department      11/15/2017 2:20 PM Saray Stovall PA-C [facility]        Today's Diagnoses     Throat pain    -  1       Follow-ups after your visit        Who to contact     If you have questions or need follow up information about today's clinic visit or your schedule please contact [facility] directly at [phone].  Normal or non-critical lab and imaging results will be communicated to you by [employer], letter or phone within 4 business days after the clinic has received the results. If you do not hear from us within 7 days, please contact the clinic through [employer] or phone. If you have a critical or abnormal lab result, we will notify you by phone as soon as possible.  Submit refill requests through [employer] or call your pharmacy and they will forward the refill request to us. Please allow 3 business days for your refill to be completed.          Additional Information About Your Visit        MyChart Information     [employer] gives you secure access to your electronic health record. If you see a primary care provider, you can also send messages to your care team and make appointments. If you have questions, please call your primary care clinic.  If you do not have a primary care provider, please call [phone] and they will assist you.        Care EveryWhere ID     This is your Care EveryWhere ID. This could be used by other organizations to access your [place] medical records  [id number]        Your Vitals Were     Pulse Temperature Height Pulse Oximetry BMI (Body Mass Index)       86 98.5  F (36.9  C) (Oral) 5' 10\" (1.778 m) 96% 22.67 kg/m2        Blood Pressure from Last 3 Encounters:   11/15/17 102/64   11/01/17 128/82   10/05/17 124/60    Weight from Last 3 Encounters: "   11/15/17 158 lb (71.7 kg)   11/14/17 158 lb (71.7 kg)   11/01/17 154 lb 8 oz (70.1 kg)              Today, you had the following     No orders found for display       Primary Care Provider Office Phone # Fax #    Aaron Christiansen [phone]       [address] [place] [address]        Equal Access to Services     [facility] : Hadii aad ku hadasho Soomaali, waaxda luqadaha, qaybta kaalmada adeegyada, waxay idiin hayaan adeeg christianojessicatiffanie lasydnie . So [facility] [place] [phone].    ATENCIÓN: Si habla español, tiene a fallon disposición servicios gratuitos de asistencia lingüística. Llame al [phone].    We comply with applicable federal civil rights laws and [place] laws. We do not discriminate on the basis of race, color, national origin, age, disability, sex, sexual orientation, or gender identity.            Thank you!     Thank you for choosing [facility]  for your care. Our goal is always to provide you with excellent care. Hearing back from our patients is one way we can continue to improve our services. Please take a few minutes to complete the written survey that you may receive in the mail after your visit with us. Thank you!             Your Updated Medication List - Protect others around you: Learn how to safely use, store and throw away your medicines at www.disposemymeds.org.          This list is accurate as of: 11/15/17 11:59 PM.  Always use your most recent med list.                   Brand Name Dispense Instructions for use Diagnosis    methadone 10 MG tablet    DOLOPHINE     Take 4 tablets (40 mg) by mouth every other day    Methadone maintenance therapy patient (H)

## 2017-11-15 NOTE — NURSING NOTE
"Chief Complaint   Patient presents with     Pharyngitis       Initial /64 (BP Location: Left arm, Patient Position: Chair, Cuff Size: Adult Regular)  Pulse 86  Temp 98.5  F (36.9  C) (Oral)  Ht 5' 10\" (1.778 m)  Wt 158 lb (71.7 kg)  SpO2 96%  BMI 22.67 kg/m2 Estimated body mass index is 22.67 kg/(m^2) as calculated from the following:    Height as of this encounter: 5' 10\" (1.778 m).    Weight as of this encounter: 158 lb (71.7 kg).  Medication Reconciliation: complete   Csaba Mlnarik CMA    "

## 2017-11-15 NOTE — LETTER
[address] [address]                                                                                                       [phone]    November 15, 2017    Rik Smith  [address] [place] [address]      To Whom it May Concern:    The above patient is unable to attend work for 11.15.2017 due to a medical issue. Please contact me with questions or concerns.      Sincerely,      Saray Stovall PA-C

## 2017-11-21 ENCOUNTER — MEDICAL CORRESPONDENCE (OUTPATIENT)
Dept: HEALTH INFORMATION MANAGEMENT | Facility: CLINIC | Age: 22
End: 2017-11-21

## 2018-04-04 ENCOUNTER — TELEPHONE (OUTPATIENT)
Dept: FAMILY MEDICINE | Facility: CLINIC | Age: 23
End: 2018-04-04

## 2018-04-04 NOTE — TELEPHONE ENCOUNTER
Pt is due now to update PHQ9.  [employer] message sent to pt. Follow up end date 5/5/18.   PHQ-9 SCORE 2/3/2011 10/5/2017 10/31/2017   Total Score 17 - -   Total Score - 13 3     Justin BLACKBURN CMA

## 2018-04-04 NOTE — LETTER
[facility] - [address] [address]  [phone]  April 4, 2018    Rik Smith  [address] [place] [address]    Dear Rik,    I care about your health and have reviewed your health plan. I have reviewed your medical conditions, medication list, and lab results and am making recommendations based on this review, to better manage your health.    You are in particular need of attention regarding:  -Depression    I am recommending that you: please fill out PHQ-9 and SONIDO-7, which are questionnaires regarding your mood over the last 2 weeks.     Please fill them out and send it back to me     Here is a list of Health Maintenance topics that are due now or due soon:  Health Maintenance Due   Topic Date Due     Diptheria Tetanus Pertussis (DTAP/TDAP) Vaccine (6 - Tdap) 12/15/2007     HPV IMMUNIZATION (1 of 3 - Male 3 Dose Series) 12/15/2007     Tetanus Vaccine - every 10 years  12/15/2014     Depression Action Plan Review - yearly  12/15/2014     Depression Assessment - every 6 months  04/30/2018       Please call us at [phone] (or use [employer]) to address the above recommendations.     Thank you for trusting [facility] and we appreciate the opportunity to serve you.  We look forward to supporting your healthcare needs in the future.    Healthy Regards,            Slim Christiansen M.D.

## 2018-07-27 ENCOUNTER — TELEPHONE (OUTPATIENT)
Dept: FAMILY MEDICINE | Facility: CLINIC | Age: 23
End: 2018-07-27

## 2018-07-27 NOTE — TELEPHONE ENCOUNTER
"DERRICK Christiansen    Patient states that 2 days ago his co-workers told him that he had a seizure. States that he has never had a seizure before.  States that they called an ambulance, but he refused to go. States that he did not remember anything that happened, so he was denying it. No known seizures since.     He states that now after talking with them that he did have a seizure. He states, \"his spine is killing him.\" States that he has a sore neck, pain between his shoulder blades and scratches.    Patient states that he has been weaning down on methadone. He is down to 8 mg.    Advised that he go to  now, another person to drive. Patient agrees with plan.  Guideline used:    Franchesca Medrano.  2016. Telephone Triage Protocols for Nurses. Fifth edition.    Franchesca Huizar RN      "

## 2018-09-27 ENCOUNTER — OFFICE VISIT (OUTPATIENT)
Dept: FAMILY MEDICINE | Facility: CLINIC | Age: 23
End: 2018-09-27
Payer: COMMERCIAL

## 2018-09-27 VITALS
WEIGHT: 150 LBS | SYSTOLIC BLOOD PRESSURE: 104 MMHG | BODY MASS INDEX: 22.22 KG/M2 | OXYGEN SATURATION: 97 % | TEMPERATURE: 98 F | HEART RATE: 80 BPM | HEIGHT: 69 IN | DIASTOLIC BLOOD PRESSURE: 60 MMHG

## 2018-09-27 DIAGNOSIS — J98.01 ACUTE BRONCHOSPASM: ICD-10-CM

## 2018-09-27 DIAGNOSIS — J20.9 ACUTE BRONCHITIS, UNSPECIFIED ORGANISM: Primary | ICD-10-CM

## 2018-09-27 DIAGNOSIS — Z72.0 TOBACCO ABUSE: ICD-10-CM

## 2018-09-27 PROCEDURE — 99214 OFFICE O/P EST MOD 30 MIN: CPT | Performed by: PHYSICIAN ASSISTANT

## 2018-09-27 RX ORDER — METHYLPREDNISOLONE 4 MG
TABLET, DOSE PACK ORAL
Qty: 21 TABLET | Refills: 0 | Status: SHIPPED | OUTPATIENT
Start: 2018-09-27 | End: 2019-07-30

## 2018-09-27 RX ORDER — CEFDINIR 300 MG/1
300 CAPSULE ORAL 2 TIMES DAILY
Qty: 20 CAPSULE | Refills: 0 | Status: SHIPPED | OUTPATIENT
Start: 2018-09-27 | End: 2018-10-07

## 2018-09-27 NOTE — LETTER
[facility]  [address] [place] [address]                                                                                                       [phone]    September 27, 2018    Rik Luis  [address] [facility] [address]      To Whom it May Concern:    The above patient is able to return to work 9/28/2018.  He was ill and unable to work from 9/21/2018-9/27/2018. Please contact me with questions or concerns.      Sincerely,    Dee Rubio PA-C

## 2018-09-27 NOTE — PROGRESS NOTES
SUBJECTIVE:   Rik Smith is a 22 year old male who presents to clinic today for the following health issues:      Acute Illness   Acute illness concerns: Chest Cold Cough / Needs note to return to work  Onset: x 1 week    Fever: no    Chills/Sweats: no    Headache (location?): no    Sinus Pressure:no    Conjunctivitis:  no    Ear Pain: no    Rhinorrhea: no    Congestion: no    Sore Throat: no     Cough: YES-productive of dark yellow  sputum    Wheeze: YES    Decreased Appetite: no    Nausea: no    Vomiting: Yes  The first 2 days from coughing mucous    Diarrhea:  no    Dysuria/Freq.: no    Fatigue/Achiness: no    Sick/Strep Exposure: no     Therapies Tried and outcome: Nyquil with slight relief    Patient has had a productive cough onset 7 days ago (9/20). Denies fever or nasal mucus production. Has been exposed to chest cold circulating work. Patient is still smoking but has cut back since onset of cough. Denies ear pain and sinus pressure. No Hx of asthma.     Heroin use disorder  Controlled with methadone 40mg every other day and working well.     Problem list and histories reviewed & adjusted, as indicated.  Additional history: as documented    Patient Active Problem List   Diagnosis     Attention deficit hyperactivity disorder (ADHD)     Anxiety attack     SONIDO (generalized anxiety disorder)     Heroin use disorder, moderate, in sustained remission (H)     Tobacco use disorder     Past Surgical History:   Procedure Laterality Date     none         Social History   Substance Use Topics     Smoking status: Current Every Day Smoker     Smokeless tobacco: Never Used     Alcohol use No     Family History   Problem Relation Age of Onset     Asthma Father      Alcohol/Drug Father      Thyroid Disease Maternal Grandmother      HEART DISEASE Maternal Grandfather      Diabetes Paternal Grandmother      type 1     Lipids Paternal Grandfather      Cerebrovascular Disease Paternal Grandfather 40     Hypertension  "Paternal Grandfather      Alcohol/Drug Paternal Grandfather      Substance Abuse Brother      Heroin addiction         Current Outpatient Prescriptions   Medication Sig Dispense Refill     cefdinir (OMNICEF) 300 MG capsule Take 1 capsule (300 mg) by mouth 2 times daily for 10 days 20 capsule 0     methylPREDNISolone (MEDROL DOSEPAK) 4 MG tablet Follow package instructions 21 tablet 0     methadone (DOLOPHINE) 10 MG tablet Take 4 tablets (40 mg) by mouth every other day  0     Allergies   Allergen Reactions     Amoxicillin-Pot Clavulanate Hives       Reviewed and updated as needed this visit by clinical staff  Tobacco  Allergies  Meds  Med Hx  Surg Hx  Fam Hx  Soc Hx      Reviewed and updated as needed this visit by Provider  Tobacco  Allergies  Meds  Med Hx  Surg Hx  Fam Hx  Soc Hx        ROS:  Constitutional, HEENT, cardiovascular, pulmonary, GI, , musculoskeletal, neuro, skin, endocrine and psych systems are negative, except as otherwise noted.    This document serves as a record of the services and decisions personally performed and made by GREGOR Guaman. It was created on his behalf by Essie Mendoza, a trained medical scribe. The creation of this document is based on the provider's statements to the medical scribe.  Essie Mendoza September 27, 2018 2:21 PM      OBJECTIVE:     /60  Pulse 80  Temp 98  F (36.7  C) (Tympanic)  Ht 5' 9\" (1.753 m)  Wt 150 lb (68 kg)  SpO2 97%  BMI 22.15 kg/m2  Body mass index is 22.15 kg/(m^2).  GENERAL: healthy, alert and no distress  HENT: Erythematous posterior pharynx, ear canals and TM's normal, nose and mouth without ulcers or lesions  RESP: inspiratory wheezes throughout worse on the right, lungs clear to auscultation - no rales or rhonchi  CV: regular rate and rhythm, normal S1 S2, no S3 or S4, no murmur, click or rub, no peripheral edema and peripheral pulses strong  MS: no gross musculoskeletal defects noted, no edema  SKIN: no suspicious " lesions or rashes  NEURO: Normal strength and tone, mentation intact and speech normal  PSYCH: mentation appears normal, affect normal/bright    Diagnostic Test Results:  none     ASSESSMENT/PLAN:     Rik was seen today for uri.    Diagnoses and all orders for this visit:    Acute bronchitis, unspecified organism, Acute bronchospasm  Start Medrol Dosepak 4 mg and Omnicef 300 mg for cough resolution. Avoided Zithromax due to Methadone 40mg prescription for heroin craving suppression.   -     methylPREDNISolone (MEDROL DOSEPAK) 4 MG tablet; Follow package instructions  -     cefdinir (OMNICEF) 300 MG capsule; Take 1 capsule (300 mg) by mouth 2 times daily for 10 days    Tobacco abuse  Encouraged cessation, patient is not interested at this time.       Return if symptoms worsen or fail to improve.    The information in this document, created by the medical scribe for me, accurately reflects the services I personally performed and the decisions made by me. I have reviewed and approved this document for accuracy prior to leaving the patient care area.  September 27, 2018 2:22 PM    Dee Rubio PA-C  [facility]

## 2018-09-27 NOTE — MR AVS SNAPSHOT
"              After Visit Summary   9/27/2018    Rik Smith    MRN: [id number]           Patient Information     Date Of Birth          1995        Visit Information        Provider Department      9/27/2018 2:00 PM Dee Rubio PA-C [facility]        Today's Diagnoses     Acute bronchitis, unspecified organism    -  1    Acute bronchospasm        Tobacco abuse           Follow-ups after your visit        Who to contact     If you have questions or need follow up information about today's clinic visit or your schedule please contact [facility] directly at [phone].  Normal or non-critical lab and imaging results will be communicated to you by [employer], letter or phone within 4 business days after the clinic has received the results. If you do not hear from us within 7 days, please contact the clinic through [employer] or phone. If you have a critical or abnormal lab result, we will notify you by phone as soon as possible.  Submit refill requests through [employer] or call your pharmacy and they will forward the refill request to us. Please allow 3 business days for your refill to be completed.          Additional Information About Your Visit        MyChart Information     [employer] gives you secure access to your electronic health record. If you see a primary care provider, you can also send messages to your care team and make appointments. If you have questions, please call your primary care clinic.  If you do not have a primary care provider, please call [phone] and they will assist you.        Care EveryWhere ID     This is your Care EveryWhere ID. This could be used by other organizations to access your [facility] medical records  [id number]        Your Vitals Were     Pulse Temperature Height Pulse Oximetry BMI (Body Mass Index)       80 98  F (36.7  C) (Tympanic) 5' 9\" (1.753 m) 97% 22.15 kg/m2        Blood Pressure from Last 3 Encounters:   09/27/18 104/60 "   11/15/17 102/64   11/01/17 128/82    Weight from Last 3 Encounters:   09/27/18 150 lb (68 kg)   11/15/17 158 lb (71.7 kg)   11/14/17 158 lb (71.7 kg)              Today, you had the following     No orders found for display         Today's Medication Changes          These changes are accurate as of 9/27/18  2:22 PM.  If you have any questions, ask your nurse or doctor.               Start taking these medicines.        Dose/Directions    cefdinir 300 MG capsule   Commonly known as:  OMNICEF   Used for:  Acute bronchitis, unspecified organism, Acute bronchospasm   Started by:  Dee Rubio PA-C        Dose:  300 mg   Take 1 capsule (300 mg) by mouth 2 times daily for 10 days   Quantity:  20 capsule   Refills:  0       methylPREDNISolone 4 MG tablet   Commonly known as:  MEDROL DOSEPAK   Used for:  Acute bronchitis, unspecified organism, Acute bronchospasm   Started by:  Dee Rubio PA-C        Follow package instructions   Quantity:  21 tablet   Refills:  0            Where to get your medicines      These medications were sent to [facility] [place] [address]     Phone:  [phone]     cefdinir 300 MG capsule    methylPREDNISolone 4 MG tablet                Primary Care Provider Office Phone # Fax #    Aaron Christiansen -[phone]       [address] [place], [place] [address]        Equal Access to Services     [facility] AH: Hadii tad ku hadasho Soomaali, waaxda luqadaha, qaybta kaalmada adeegyada, alecia andersen. So [facility] [phone].    ATENCIÓN: Si habla español, tiene a fallon disposición servicios gratuitos de asistencia lingüística. Karen al [phone].    We comply with applicable federal civil rights laws and [place] laws. We do not discriminate on the basis of race, color, national origin, age, disability, sex, sexual orientation, or gender  identity.            Thank you!     Thank you for choosing [facility]  for your care. Our goal is always to provide you with excellent care. Hearing back from our patients is one way we can continue to improve our services. Please take a few minutes to complete the written survey that you may receive in the mail after your visit with us. Thank you!             Your Updated Medication List - Protect others around you: Learn how to safely use, store and throw away your medicines at www.disposemymeds.org.          This list is accurate as of 9/27/18  2:22 PM.  Always use your most recent med list.                   Brand Name Dispense Instructions for use Diagnosis    cefdinir 300 MG capsule    OMNICEF    20 capsule    Take 1 capsule (300 mg) by mouth 2 times daily for 10 days    Acute bronchitis, unspecified organism, Acute bronchospasm       methadone 10 MG tablet    DOLOPHINE     Take 4 tablets (40 mg) by mouth every other day    Methadone maintenance therapy patient (H)       methylPREDNISolone 4 MG tablet    MEDROL DOSEPAK    21 tablet    Follow package instructions    Acute bronchitis, unspecified organism, Acute bronchospasm

## 2019-01-01 ENCOUNTER — HEALTH MAINTENANCE LETTER (OUTPATIENT)
Age: 24
End: 2019-01-01

## 2019-07-30 ENCOUNTER — PATIENT OUTREACH (OUTPATIENT)
Dept: CARE COORDINATION | Facility: CLINIC | Age: 24
End: 2019-07-30

## 2019-07-30 ENCOUNTER — OFFICE VISIT (OUTPATIENT)
Dept: FAMILY MEDICINE | Facility: CLINIC | Age: 24
End: 2019-07-30

## 2019-07-30 VITALS
DIASTOLIC BLOOD PRESSURE: 70 MMHG | RESPIRATION RATE: 18 BRPM | SYSTOLIC BLOOD PRESSURE: 120 MMHG | WEIGHT: 151.9 LBS | HEIGHT: 69 IN | OXYGEN SATURATION: 100 % | HEART RATE: 66 BPM | BODY MASS INDEX: 22.5 KG/M2 | TEMPERATURE: 97.8 F

## 2019-07-30 DIAGNOSIS — K04.7 TOOTH ABSCESS: Primary | ICD-10-CM

## 2019-07-30 PROCEDURE — 99213 OFFICE O/P EST LOW 20 MIN: CPT | Performed by: PHYSICIAN ASSISTANT

## 2019-07-30 ASSESSMENT — ANXIETY QUESTIONNAIRES
6. BECOMING EASILY ANNOYED OR IRRITABLE: NEARLY EVERY DAY
2. NOT BEING ABLE TO STOP OR CONTROL WORRYING: NEARLY EVERY DAY
IF YOU CHECKED OFF ANY PROBLEMS ON THIS QUESTIONNAIRE, HOW DIFFICULT HAVE THESE PROBLEMS MADE IT FOR YOU TO DO YOUR WORK, TAKE CARE OF THINGS AT HOME, OR GET ALONG WITH OTHER PEOPLE: EXTREMELY DIFFICULT
7. FEELING AFRAID AS IF SOMETHING AWFUL MIGHT HAPPEN: NEARLY EVERY DAY
3. WORRYING TOO MUCH ABOUT DIFFERENT THINGS: NEARLY EVERY DAY
GAD7 TOTAL SCORE: 19
5. BEING SO RESTLESS THAT IT IS HARD TO SIT STILL: MORE THAN HALF THE DAYS
1. FEELING NERVOUS, ANXIOUS, OR ON EDGE: NEARLY EVERY DAY

## 2019-07-30 ASSESSMENT — PATIENT HEALTH QUESTIONNAIRE - PHQ9
5. POOR APPETITE OR OVEREATING: MORE THAN HALF THE DAYS
SUM OF ALL RESPONSES TO PHQ QUESTIONS 1-9: 12

## 2019-07-30 ASSESSMENT — MIFFLIN-ST. JEOR: SCORE: 1679.39

## 2019-07-30 NOTE — LETTER
July 30, 2019      Rik Smith  [address] [address] [address]        To Whom It May Concern,     Rik is under my care and seen in clinic today.  Please excuse his absence      Sincerely,        Ramona Ann Aaseby-Aguilera, PA-C

## 2019-07-30 NOTE — PROGRESS NOTES
"Subjective     Rik Smith is a 23 year old male who presents to clinic today for the following health issues:    HPI   Pt has pain on left side of face from throat to temple. He thinks he might have an infection. Has not been seen in the dentist for awhile     Sore throat for a couple weeks.  And think its most likly from an absess tooth              Reviewed and updated as needed this visit by Provider         Review of Systems   ROS COMP: Constitutional, HEENT, cardiovascular, pulmonary, gi and gu systems are negative, except as otherwise noted.      Objective    /70 (BP Location: Right arm, Patient Position: Chair, Cuff Size: Adult Regular)   Pulse 66   Temp 97.8  F (36.6  C) (Oral)   Resp 18   Ht 1.753 m (5' 9\")   Wt 68.9 kg (151 lb 14.4 oz)   SpO2 100%   BMI 22.43 kg/m    Body mass index is 22.43 kg/m .  Physical Exam   GENERAL: healthy, alert and no distress  HENT: normal cephalic/atraumatic, ear canals and TM's normal, nose and mouth without ulcers or lesions, oropharynx clear and oral mucous membranes moist.  Multiple molars with huge cavitites. No swelling over gumline  RESP: lungs clear to auscultation - no rales, rhonchi or wheezes  CV: regular rate and rhythm, normal S1 S2, no S3 or S4, no murmur, click or rub, no peripheral edema and peripheral pulses strong  Lympn: enlarged submandibular node on left          Assessment & Plan     1. Tooth abscess  Most likely due to infection in one of the molars with cavities.   Advised the smile clinic or other sliding scale clinic as he does not have insurance  - CARE COORDINATION REFERRAL     Tobacco Cessation:   reports that he has been smoking.  He has never used smokeless tobacco.              No follow-ups on file.    Ramona Ann Aaseby-Aguilera, PA-C  [facility]        "

## 2019-07-30 NOTE — LETTER
[place] CARE COORDINATION  [address] [address]  August 27, 2019    Rik Smith  [address] [address] [address]      Dear Rik,    I am a clinic care coordinator who works with Aaron Christiansen MD at the [facility]. I recently tried to call and was unable to reach you. I wanted to follow-up with you to ensure that you were able to access dental care in the community. Below are a few resources that may be beneficial to you if you still require dental care.     Dental Low cost   Dental Life Line [phone] [email or web address]  [place] for those needing dental care   Donated Dental Services (DDS) For an application in [place], call [phone] or [phone]. Serves only disabled, elderly or medically compromised people who have no other way of paying for dental care.  Free comprehensive dental services for those who qualify.  Qualified applicants will be provided services by a private practicing dentist in their office.     Dentistry from the [facility] [phone] Offers certain dental services in private dental offices around [place] for those who cannot afford treatment   [facility] [address] [place], [place] - [id number] [phone]   Hours Mon, Tues & Wed: 7:30 a.m. to 5:30 p.m. Thursday & Friday: 8:30 a.m. to 5 p.m. At this facility most insurance plans are accepted including Medical Assistance, [facility], HealthPartners, UCare, BlueCrossBlueShield/BluePlus, [facility], [employer], [facility], [place] [facility] [employer], Pr  email website [facility]    [address] - [address] [phone] Anyone can receive services Public assistance and most dental insurance policies accepted Appointments only. Hours: Mon - Fri, 7:30am - 6pm; Sat, Sun, closed.   [place] Donated Dental   financing [phone]   Matches dentists willing to give free care to the permanently disabled;  elderly or children with or without disabilities. All categories of clients may not have any dental insurance.    [facility] [address] [place], [place] - [address] [phone] Anyone can receive services. Hygiene Clinic only (cleaning, fluoride treatments, x-rays, sealants, oral hygiene instruction). By appointment only   [facility]   [phone] [email or web address]   Serves people of all ages who are currently not covered by dental insurance and live in [facility] [facility]. Services include pain management, extractions/pulling teeth, cavities and fillings. $20 fee per visit. Limited hours.    State Operated Services   [place] [phone]  [place] [phone]  [place] [phone]  [place] [phone]  [place] [phone] Serves people with mental illnesses, development disabilities, chemical dependency, or traumatic brain injuries. Five sites within [place] offer dental services:   [facility] Dental Care Resources [facility]  [address] [address] [phone] Anyone can receive services Prices start at: Cleanings (adults $20, children $10) Flouride treatment (children $5) Sealants $5 X-rays (adults $20, children $5) Call for appointment. Prices are subject to change.   [facility]  [address] [facility] [address] [facility] [address] [phone] [facility] Provides and free dental care to low income persons. The clientele cannot have insurance and cannot pay for the treatment. Services not provided: dentures, bridges, root canals, cleanings or specialty dental treatment. Also provides cleaning clinics.   [facility]    [address] [place], [place] - [address] [phone] Anyone can receive services M T F 8:30 am -  5:00 pm Wednesdays 8:30 am - 8:00 pm Thursdays 8:30 am - 7:00 pm Fridays - Walk-in emergencies       Please feel free to contact me at [phone], with any questions or concerns. We at [facility] are focused on providing you with the highest-quality healthcare experience possible and that all starts with you.     Sincerely,       Martha Neff, [facility]  Clinic Care Coordinator  Ph. [phone]  [email or web address]      Enclosed: I have enclosed helpful educational material. Please review and call me with any questions.

## 2019-07-31 ASSESSMENT — ANXIETY QUESTIONNAIRES: GAD7 TOTAL SCORE: 19

## 2019-08-01 ASSESSMENT — ACTIVITIES OF DAILY LIVING (ADL): DEPENDENT_IADLS:: INDEPENDENT

## 2019-08-01 NOTE — PROGRESS NOTES
"Clinic Care Coordination Contact  [facility]/Voicemail    Referral Source: PCP  Clinical Data: Care Coordinator Outreach  Referral received that Pt is interested in dentists that offer services on a sliding scale. Pt needs to address \"molars that are decaying.\"  Outreach attempted x 1.  Left message on voicemail with call back information and requested return call.  Plan: Care Coordinator will try to reach patient again in 3-5 business days.      Martha Neff [facility]  Clinic Care Coordinator  Ph. [phone]  [email or web address]      "

## 2019-08-27 ENCOUNTER — OFFICE VISIT (OUTPATIENT)
Dept: FAMILY MEDICINE | Facility: CLINIC | Age: 24
End: 2019-08-27

## 2019-08-27 VITALS
HEIGHT: 69 IN | HEART RATE: 86 BPM | DIASTOLIC BLOOD PRESSURE: 80 MMHG | BODY MASS INDEX: 21.62 KG/M2 | SYSTOLIC BLOOD PRESSURE: 118 MMHG | WEIGHT: 146 LBS | OXYGEN SATURATION: 97 % | TEMPERATURE: 98.6 F

## 2019-08-27 DIAGNOSIS — K52.9 GASTROENTERITIS: Primary | ICD-10-CM

## 2019-08-27 PROCEDURE — 99213 OFFICE O/P EST LOW 20 MIN: CPT | Performed by: FAMILY MEDICINE

## 2019-08-27 ASSESSMENT — MIFFLIN-ST. JEOR: SCORE: 1652.63

## 2019-08-27 NOTE — LETTER
[facility] - [address] [address]                                                                                                       [phone]    August 27, 2019    Rik Smith  [id number] [place] [address]      To Whom it May Concern:    Raheel was seen in clinic today.  If he has no further symptoms, ok to return to work 8/27 pm.    Please contact me with questions or concerns.      Sincerely,        Ed Wang M.D.

## 2019-08-27 NOTE — PROGRESS NOTES
Clinic Care Coordination Contact  [facility]/Voicemail    Referral Source: PCP  Clinical Data: Care Coordinator Outreach  Outreach attempted x 2.  Left message on voicemail with call back information and requested return call.  Plan: Care Coordinator will mail out care coordination introduction letter with care coordinator contact information and explanation of care coordination services, along with dental resources. Care Coordinator will do no further outreaches at this time.      Martha Neff [facility]  Clinic Care Coordinator  Ph. [phone]  [email or web address]

## 2019-08-27 NOTE — PROGRESS NOTES
SUBJECTIVE:                                                    Rik Smith is a 23 year old male who presents to clinic today for the following health issues:    Diarrhea - works in a kitchen - [employer]  Onset: 2 days    Description:   Consistency of stool: loose  Blood in stool: no   Number of loose stools in past 24 hours: 2    Progression of Symptoms:  improving    Accompanying Signs & Symptoms:  Fever: YES- yesterday   Nausea or vomiting; YES  Abdominal pain: YES  Episodes of constipation: no   Weight loss: no     History:   Ill contacts: no   Recent use of antibiotics: no    Recent travels: no          Recent medication-new or changes(Rx or OTC): no     Precipitating factors:   Thinks it was the chicken he cooked 2 nights ago gave him food poisoning    Alleviating factors:   none  Therapies Tried and outcome:  none    Needs work note to return to work. Patient denies cough, ear pain, and sinus pressure/congestion. Patient reports his last episode of Nausea was last night and his last episode of diarrhea was also last night. Patient states he feels about 70% back to normal. Patient denies any f/c/s today. Feels well enough to be able to work.    Reviewed and updated as needed this visit by Provider       BP Readings from Last 3 Encounters:   08/27/19 118/80   07/30/19 120/70   09/27/18 104/60       body mass index is 21.56 kg/m .    Wt Readings from Last 4 Encounters:   08/27/19 66.2 kg (146 lb)   07/30/19 68.9 kg (151 lb 14.4 oz)   09/27/18 68 kg (150 lb)   11/15/17 71.7 kg (158 lb)       Health Maintenance    Health Maintenance Due   Topic Date Due     PREVENTIVE CARE VISIT  1996     DEPRESSION ACTION PLAN  1996     DTAP/TDAP/TD IMMUNIZATION (6 - Tdap) 12/15/2007     HIV SCREENING  12/15/2011       Current Problem List    Patient Active Problem List   Diagnosis     Attention deficit hyperactivity disorder (ADHD)     Anxiety attack     SONIDO (generalized anxiety disorder)     Heroin use  disorder, moderate, in sustained remission (H)     Tobacco use disorder       Past Medical History    Past Medical History:   Diagnosis Date     Heroin use disorder, moderate, in sustained remission (H) 10/05/2017    [facility]       Past Surgical History    Past Surgical History:   Procedure Laterality Date     none         Current Medications    No current outpatient medications on file.       Allergies    Allergies   Allergen Reactions     Amoxicillin-Pot Clavulanate Hives     Cat Hair Extract      Other reaction(s): Erythema     Trazodone      Other reaction(s): Vomiting       Immunizations    Immunization History   Administered Date(s) Administered     DTAP (<7y) 02/20/1997, 04/30/1997, 07/02/1997, 06/17/1998, 06/05/2002     HepB 1996, 02/20/1997, 04/15/1998     Hib (PRP-T) 02/20/1997, 04/30/1997, 07/02/1997, 04/15/1998, 06/17/1998     MMR 04/15/1998, 06/05/2002     Poliovirus, inactivated (IPV) 02/20/1997, 04/30/1997, 06/17/1998, 06/05/2002     Varicella 09/15/1999       Family History    Family History   Problem Relation Age of Onset     Asthma Father      Alcohol/Drug Father      Thyroid Disease Maternal Grandmother      Heart Disease Maternal Grandfather      Diabetes Paternal Grandmother         type 1     Lipids Paternal Grandfather      Cerebrovascular Disease Paternal Grandfather 40     Hypertension Paternal Grandfather      Alcohol/Drug Paternal Grandfather      Substance Abuse Brother         Heroin addiction       Social History    Social History     Socioeconomic History     Marital status: Single     Spouse name: Not on file     Number of children: 0     Years of education: Not on file     Highest education level: Not on file   Occupational History     Occupation: [employer]   Social Needs     Financial resource strain: Not on file     Food insecurity:     Worry: Not on file     Inability: Not on file     Transportation needs:     Medical: Not on file     Non-medical: Not on  "file   Tobacco Use     Smoking status: Current Every Day Smoker     Smokeless tobacco: Never Used     Tobacco comment: 1 cigar every other day   Substance and Sexual Activity     Alcohol use: No     Drug use: Not Currently     Types: Other     Sexual activity: Yes     Partners: Female   Lifestyle     Physical activity:     Days per week: Not on file     Minutes per session: Not on file     Stress: Not on file   Relationships     Social connections:     Talks on phone: Not on file     Gets together: Not on file     Attends Buddhism service: Not on file     Active member of club or organization: Not on file     Attends meetings of clubs or organizations: Not on file     Relationship status: Not on file     Intimate partner violence:     Fear of current or ex partner: Not on file     Emotionally abused: Not on file     Physically abused: Not on file     Forced sexual activity: Not on file   Other Topics Concern     Parent/sibling w/ CABG, MI or angioplasty before 65F 55M? Not Asked   Social History Narrative     Not on file       All above reviewed and updated, all stable unless otherwise noted    Recent labs reviewed    ROS:  Constitutional, HEENT, cardiovascular, pulmonary, GI, , musculoskeletal, neuro, skin, endocrine and psych systems are negative, except as otherwise noted.    OBJECTIVE:                                                    /80   Pulse 86   Temp 98.6  F (37  C) (Oral)   Ht 1.753 m (5' 9\")   Wt 66.2 kg (146 lb)   SpO2 97%   BMI 21.56 kg/m    Body mass index is 21.56 kg/m .  GENERAL: healthy, alert and no distress  EYES: Eyes grossly normal to inspection  HENT:ear canals and TM's normal upon viewing with otoscope, nose and mouth without ulcers or lesions upon viewing with otoscope  NECK: no tenderness, no adenopathy, no asymmetry, no masses, no stiffness; thyroid- normal to palpation  RESP: lungs clear to auscultation - no rales, no rhonchi, no wheezes  CV: regular rates and rhythm, " normal S1 S2, no S3 or S4 and no murmur, no click or rub -  ABDOMEN: soft, no tenderness, no  hepatosplenomegaly, no masses, normal bowel sounds  MS: extremities- no gross deformities noted, no edema  SKIN: no suspicious lesions, no rashes  NEURO: strength and tone- normal, sensory exam- grossly normal, mentation- intact, speech- normal, reflexes- symmetric  BACK: no CVA tenderness, no paralumbar tenderness  PSYCH: Alert and oriented times 3; speech- coherent , normal rate and volume; able to articulate logical thoughts, able to abstract reason, no tangential thoughts, no hallucinations or delusions, affect- normal    DIAGNOSTICS/PROCEDURES:                                                      No results found for this or any previous visit (from the past 24 hour(s)).     ASSESSMENT:                                                        ICD-10-CM    1. Gastroenteritis K52.9        PLAN:                                                    Discussed treatment/modality options, including risk and benefits he desires:    advised 1 multivitamin per day, advised dentist every 6 months, advised diet and exercise and advised opthalmologist every 1-2 years.     1) Patient presented today with some recent nausea and vomiting. Patient reports he is 70% improved since onset of stomach flu symptoms. Patient provided return to work note today. Viral vs food.    2) Follow up if symptoms persist or worsen.     All diagnosis above reviewed and noted above, otherwise stable.  See [facility] orders for further details.     Return in about 1 week (around 9/3/2019), or if symptoms worsen or fail to improve, for Acute Follow up.    Health Maintenance Due   Topic Date Due     PREVENTIVE CARE VISIT  1996     DEPRESSION ACTION PLAN  1996     DTAP/TDAP/TD IMMUNIZATION (6 - Tdap) 12/15/2007     HIV SCREENING  12/15/2011     This document serves as a record of the services and decisions personally performed and made by Ed Wang  MD. It was created on his behalf by John Tomas, a trained medical scribe. The creation of this document is based on the provider's statements to the medical scribe.  John Tomas August 27, 2019 10:01 AM     The information in this document, created by the medical scribe for me, accurately reflects the services I personally performed and the decisions made by me. I have reviewed and approved this document for accuracy prior to leaving the patient care area.  August 27, 2019            Ed Wang MD [address]  [phone] Fax

## 2019-10-01 ENCOUNTER — TRANSFERRED RECORDS (OUTPATIENT)
Dept: HEALTH INFORMATION MANAGEMENT | Facility: CLINIC | Age: 24
End: 2019-10-01

## 2021-01-15 ENCOUNTER — HEALTH MAINTENANCE LETTER (OUTPATIENT)
Age: 26
End: 2021-01-15